# Patient Record
Sex: MALE | Race: BLACK OR AFRICAN AMERICAN | URBAN - METROPOLITAN AREA
[De-identification: names, ages, dates, MRNs, and addresses within clinical notes are randomized per-mention and may not be internally consistent; named-entity substitution may affect disease eponyms.]

---

## 2022-02-15 ENCOUNTER — OFFICE VISIT (OUTPATIENT)
Dept: URBAN - METROPOLITAN AREA CLINIC 74 | Facility: CLINIC | Age: 72
End: 2022-02-15
Payer: MEDICARE

## 2022-02-15 ENCOUNTER — WEB ENCOUNTER (OUTPATIENT)
Dept: URBAN - METROPOLITAN AREA CLINIC 74 | Facility: CLINIC | Age: 72
End: 2022-02-15

## 2022-02-15 DIAGNOSIS — K21.9 GASTROESOPHAGEAL REFLUX DISEASE WITHOUT ESOPHAGITIS: ICD-10-CM

## 2022-02-15 DIAGNOSIS — Z12.11 COLON CANCER SCREENING: ICD-10-CM

## 2022-02-15 DIAGNOSIS — Z80.0 FAMILY HISTORY OF MALIGNANT NEOPLASM OF COLON IN RELATIVE DIAGNOSED WHEN OLDER THAN 50 YEARS OF AGE: ICD-10-CM

## 2022-02-15 PROCEDURE — 99203 OFFICE O/P NEW LOW 30 MIN: CPT | Performed by: STUDENT IN AN ORGANIZED HEALTH CARE EDUCATION/TRAINING PROGRAM

## 2022-02-15 RX ORDER — BENAZEPRIL HYDROCHLORIDE 40 MG/1
1 TABLET TABLET, FILM COATED ORAL ONCE A DAY
Status: ACTIVE | COMMUNITY

## 2022-02-15 RX ORDER — APIXABAN 5 MG/1
AS DIRECTED TABLET, FILM COATED ORAL
Status: ACTIVE | COMMUNITY

## 2022-02-15 RX ORDER — DICLOFENAC POTASSIUM 50 MG/1
1 TABLET WITH FOOD OR MILK AS NEEDED TABLET, FILM COATED ORAL TWICE A DAY
Status: ACTIVE | COMMUNITY

## 2022-02-15 RX ORDER — AMLODIPINE BESYLATE 10 MG/1
1 TABLET TABLET ORAL ONCE A DAY
Status: ACTIVE | COMMUNITY

## 2022-02-15 RX ORDER — CLONIDINE 0.3 MG/D
1 PATCH TO SKIN PATCH TRANSDERMAL
Status: ACTIVE | COMMUNITY

## 2022-02-15 RX ORDER — ATORVASTATIN CALCIUM 40 MG/1
1 TABLET TABLET, FILM COATED ORAL ONCE A DAY
Status: ACTIVE | COMMUNITY

## 2022-02-15 RX ORDER — FUROSEMIDE 20 MG/1
1 TABLET TABLET ORAL ONCE A DAY
Status: ACTIVE | COMMUNITY

## 2022-02-15 RX ORDER — CARVEDILOL 25 MG/1
1 TABLET WITH FOOD TABLET, FILM COATED ORAL TWICE A DAY
Status: ACTIVE | COMMUNITY

## 2022-02-15 RX ORDER — OMEPRAZOLE 40 MG/1
1 CAPSULE 30 MINUTES BEFORE MORNING MEAL CAPSULE, DELAYED RELEASE ORAL ONCE A DAY
Status: ACTIVE | COMMUNITY

## 2022-02-15 RX ORDER — METFORMIN HYDROCHLORIDE 500 MG/1
1 TABLET WITH A MEAL TABLET, FILM COATED ORAL ONCE A DAY
Status: ACTIVE | COMMUNITY

## 2022-02-15 RX ORDER — ALLOPURINOL 100 MG/1
1 TABLET TABLET ORAL ONCE A DAY
Status: ACTIVE | COMMUNITY

## 2022-02-15 RX ORDER — POLYETHYLENE GLYCOL 3350, SODIUM SULFATE, SODIUM CHLORIDE, POTASSIUM CHLORIDE, ASCORBIC ACID, SODIUM ASCORBATE 140-9-5.2G
AS DIRECTED KIT ORAL ONCE
Qty: 1 KIT | Refills: 0 | OUTPATIENT
Start: 2022-02-15 | End: 2022-02-16

## 2022-02-15 RX ORDER — ONDANSETRON 4 MG/1
1 TABLET ON THE TONGUE AND ALLOW TO DISSOLVE TABLET, ORALLY DISINTEGRATING ORAL ONCE A DAY
Status: ACTIVE | COMMUNITY

## 2022-02-15 NOTE — HPI-TODAY'S VISIT:
72 yo M  new to me  Mild compensated CHF (last EF- 41-45%) currently euvolemic A. fib and on eliquis Diabetic - no insulin dependent  Family h/o colon cancer in father and brother both at age 60s.  Last colonoscopy about 10 years or more back. No polyps on last colonoscopy.  No abdomen pain, nausea, vomiting, constipation, diarrhea, blood in stool, reflux (well controlled on daily PPI) or dysphagia (h/o dysphagia and EGD dilation about 10 years back but has done well since then on PPI). Appetite at baseline. Denies any unintentional weight loss.

## 2022-02-24 ENCOUNTER — TELEPHONE ENCOUNTER (OUTPATIENT)
Dept: URBAN - METROPOLITAN AREA CLINIC 74 | Facility: CLINIC | Age: 72
End: 2022-02-24

## 2022-05-05 ENCOUNTER — OFFICE VISIT (OUTPATIENT)
Dept: URBAN - METROPOLITAN AREA SURGERY CENTER 30 | Facility: SURGERY CENTER | Age: 72
End: 2022-05-05

## 2022-06-01 ENCOUNTER — TELEPHONE ENCOUNTER (OUTPATIENT)
Dept: URBAN - METROPOLITAN AREA CLINIC 6 | Facility: CLINIC | Age: 72
End: 2022-06-01

## 2022-06-09 ENCOUNTER — OFFICE VISIT (OUTPATIENT)
Dept: URBAN - METROPOLITAN AREA SURGERY CENTER 30 | Facility: SURGERY CENTER | Age: 72
End: 2022-06-09

## 2022-08-29 ENCOUNTER — OFFICE VISIT (OUTPATIENT)
Dept: URBAN - METROPOLITAN AREA CLINIC 74 | Facility: CLINIC | Age: 72
End: 2022-08-29

## 2022-09-12 ENCOUNTER — LAB OUTSIDE AN ENCOUNTER (OUTPATIENT)
Dept: URBAN - METROPOLITAN AREA CLINIC 74 | Facility: CLINIC | Age: 72
End: 2022-09-12

## 2022-09-12 ENCOUNTER — OFFICE VISIT (OUTPATIENT)
Dept: URBAN - METROPOLITAN AREA CLINIC 74 | Facility: CLINIC | Age: 72
End: 2022-09-12
Payer: MEDICARE

## 2022-09-12 DIAGNOSIS — Z12.11 COLON CANCER SCREENING: ICD-10-CM

## 2022-09-12 DIAGNOSIS — R11.0 NAUSEA: ICD-10-CM

## 2022-09-12 DIAGNOSIS — K21.9 GASTROESOPHAGEAL REFLUX DISEASE WITHOUT ESOPHAGITIS: ICD-10-CM

## 2022-09-12 DIAGNOSIS — Z80.0 FAMILY HISTORY OF MALIGNANT NEOPLASM OF COLON IN RELATIVE DIAGNOSED WHEN OLDER THAN 50 YEARS OF AGE: ICD-10-CM

## 2022-09-12 PROCEDURE — 99214 OFFICE O/P EST MOD 30 MIN: CPT | Performed by: INTERNAL MEDICINE

## 2022-09-12 RX ORDER — SILDENAFIL CITRATE 100 MG/1
1 TABLET AS NEEDED TABLET, FILM COATED ORAL ONCE A DAY
Status: ACTIVE | COMMUNITY

## 2022-09-12 RX ORDER — CLONIDINE 0.3 MG/D
1 PATCH TO SKIN PATCH TRANSDERMAL
Status: ACTIVE | COMMUNITY

## 2022-09-12 RX ORDER — ONDANSETRON 4 MG/1
1 TABLET ON THE TONGUE AND ALLOW TO DISSOLVE TABLET, ORALLY DISINTEGRATING ORAL ONCE A DAY
Status: ACTIVE | COMMUNITY

## 2022-09-12 RX ORDER — ALLOPURINOL 100 MG/1
1 TABLET TABLET ORAL ONCE A DAY
Status: ACTIVE | COMMUNITY

## 2022-09-12 RX ORDER — POTASSIUM CHLORIDE 1500 MG/1
1 TABLET WITH FOOD TABLET, EXTENDED RELEASE ORAL ONCE A DAY
Status: ACTIVE | COMMUNITY

## 2022-09-12 RX ORDER — AMLODIPINE BESYLATE 10 MG/1
1 TABLET TABLET ORAL ONCE A DAY
Status: ACTIVE | COMMUNITY

## 2022-09-12 RX ORDER — TRAZODONE HYDROCHLORIDE 100 MG/1
1 TABLET AT BEDTIME TABLET ORAL ONCE A DAY
Status: ACTIVE | COMMUNITY

## 2022-09-12 RX ORDER — OMEPRAZOLE 40 MG/1
1 CAPSULE 30 MINUTES BEFORE MORNING MEAL CAPSULE, DELAYED RELEASE ORAL ONCE A DAY
Status: ACTIVE | COMMUNITY

## 2022-09-12 RX ORDER — CARVEDILOL 25 MG/1
1 TABLET WITH FOOD TABLET, FILM COATED ORAL TWICE A DAY
Status: ACTIVE | COMMUNITY

## 2022-09-12 RX ORDER — DICLOFENAC POTASSIUM 50 MG/1
1 TABLET WITH FOOD OR MILK AS NEEDED TABLET, FILM COATED ORAL TWICE A DAY
Status: ACTIVE | COMMUNITY

## 2022-09-12 RX ORDER — METFORMIN HYDROCHLORIDE 500 MG/1
1 TABLET WITH A MEAL TABLET, FILM COATED ORAL ONCE A DAY
Status: ACTIVE | COMMUNITY

## 2022-09-12 RX ORDER — BENAZEPRIL HYDROCHLORIDE 40 MG/1
1 TABLET TABLET, FILM COATED ORAL ONCE A DAY
Status: ACTIVE | COMMUNITY

## 2022-09-12 RX ORDER — APIXABAN 5 MG/1
AS DIRECTED TABLET, FILM COATED ORAL
Status: ACTIVE | COMMUNITY

## 2022-09-12 RX ORDER — ATORVASTATIN CALCIUM 40 MG/1
1 TABLET TABLET, FILM COATED ORAL ONCE A DAY
Status: ACTIVE | COMMUNITY

## 2022-09-12 RX ORDER — FUROSEMIDE 20 MG/1
1 TABLET TABLET ORAL ONCE A DAY
Status: ACTIVE | COMMUNITY

## 2022-09-12 RX ORDER — POLYETHYLENE GLYCOL 3350, SODIUM SULFATE ANHYDROUS, SODIUM BICARBONATE, SODIUM CHLORIDE, POTASSIUM CHLORIDE 236; 22.74; 6.74; 5.86; 2.97 G/4L; G/4L; G/4L; G/4L; G/4L
AS DIRECTED POWDER, FOR SOLUTION ORAL
Qty: 1 BOTTLE | Refills: 0 | OUTPATIENT
Start: 2022-09-12 | End: 2022-09-13

## 2022-09-12 NOTE — HPI-TODAY'S VISIT:
73 yo AAM here for constipation. prune juice helps. he gets short of breath and bloated when constipation worsens. on diuretics that make him constipated. Has aortic aneurysm. it was 5 cm last year. just had CTA done. result is not back yet.  Mild compensated CHF (last EF- 41-45%) currently euvolemic on diuretics A. fib and on eliquis Diabetic - no insulin dependent  Family h/o colon cancer in father and brother both at age 60s. sister had gastric cancer  Last colonoscopy about 10 years or more back. No polyps on last colonoscopy.  Has been experiencing nausea/bloating. occ dizziness. better after BM. Appetite at baseline. weight is down after starting diuretics. c/o orthopnea

## 2022-09-19 ENCOUNTER — TELEPHONE ENCOUNTER (OUTPATIENT)
Dept: URBAN - METROPOLITAN AREA CLINIC 74 | Facility: CLINIC | Age: 72
End: 2022-09-19

## 2022-09-22 ENCOUNTER — OFFICE VISIT (OUTPATIENT)
Dept: URBAN - METROPOLITAN AREA CLINIC 74 | Facility: CLINIC | Age: 72
End: 2022-09-22

## 2022-10-10 ENCOUNTER — TELEPHONE ENCOUNTER (OUTPATIENT)
Dept: URBAN - METROPOLITAN AREA CLINIC 74 | Facility: CLINIC | Age: 72
End: 2022-10-10

## 2022-11-15 ENCOUNTER — OFFICE VISIT (OUTPATIENT)
Dept: URBAN - METROPOLITAN AREA MEDICAL CENTER 9 | Facility: MEDICAL CENTER | Age: 72
End: 2022-11-15
Payer: MEDICARE

## 2022-11-15 DIAGNOSIS — Z12.11 COLON CANCER SCREENING: ICD-10-CM

## 2022-11-15 DIAGNOSIS — K29.60 ADENOPAPILLOMATOSIS GASTRICA: ICD-10-CM

## 2022-11-15 DIAGNOSIS — R10.13 ABDOMINAL DISCOMFORT, EPIGASTRIC: ICD-10-CM

## 2022-11-15 DIAGNOSIS — D12.3 ADENOMA OF TRANSVERSE COLON: ICD-10-CM

## 2022-11-15 DIAGNOSIS — K21.9 ACID REFLUX: ICD-10-CM

## 2022-11-15 PROCEDURE — 43239 EGD BIOPSY SINGLE/MULTIPLE: CPT | Performed by: INTERNAL MEDICINE

## 2022-11-15 PROCEDURE — 45385 COLONOSCOPY W/LESION REMOVAL: CPT | Performed by: INTERNAL MEDICINE

## 2022-12-12 ENCOUNTER — OFFICE VISIT (OUTPATIENT)
Dept: URBAN - METROPOLITAN AREA CLINIC 74 | Facility: CLINIC | Age: 72
End: 2022-12-12
Payer: MEDICARE

## 2022-12-12 VITALS
DIASTOLIC BLOOD PRESSURE: 98 MMHG | HEIGHT: 68 IN | WEIGHT: 219 LBS | BODY MASS INDEX: 33.19 KG/M2 | TEMPERATURE: 96.8 F | SYSTOLIC BLOOD PRESSURE: 124 MMHG | HEART RATE: 78 BPM

## 2022-12-12 DIAGNOSIS — Z12.11 COLON CANCER SCREENING: ICD-10-CM

## 2022-12-12 DIAGNOSIS — Z80.0 FAMILY HISTORY OF MALIGNANT NEOPLASM OF COLON IN RELATIVE DIAGNOSED WHEN OLDER THAN 50 YEARS OF AGE: ICD-10-CM

## 2022-12-12 DIAGNOSIS — R11.0 NAUSEA: ICD-10-CM

## 2022-12-12 DIAGNOSIS — K21.9 GASTROESOPHAGEAL REFLUX DISEASE WITHOUT ESOPHAGITIS: ICD-10-CM

## 2022-12-12 PROBLEM — 154051000119101: Status: ACTIVE | Noted: 2022-02-15

## 2022-12-12 PROBLEM — 266435005: Status: ACTIVE | Noted: 2022-02-15

## 2022-12-12 PROBLEM — 422587007: Status: ACTIVE | Noted: 2022-09-12

## 2022-12-12 PROCEDURE — 99213 OFFICE O/P EST LOW 20 MIN: CPT | Performed by: INTERNAL MEDICINE

## 2022-12-12 RX ORDER — OMEPRAZOLE 40 MG/1
1 CAPSULE 30 MINUTES BEFORE MORNING MEAL CAPSULE, DELAYED RELEASE ORAL ONCE A DAY
Status: ACTIVE | COMMUNITY

## 2022-12-12 RX ORDER — APIXABAN 5 MG/1
AS DIRECTED TABLET, FILM COATED ORAL
Status: ACTIVE | COMMUNITY

## 2022-12-12 RX ORDER — ONDANSETRON 4 MG/1
1 TABLET ON THE TONGUE AND ALLOW TO DISSOLVE TABLET, ORALLY DISINTEGRATING ORAL ONCE A DAY
Status: ACTIVE | COMMUNITY

## 2022-12-12 RX ORDER — ATORVASTATIN CALCIUM 40 MG/1
1 TABLET TABLET, FILM COATED ORAL ONCE A DAY
Status: ACTIVE | COMMUNITY

## 2022-12-12 RX ORDER — ALLOPURINOL 100 MG/1
1 TABLET TABLET ORAL ONCE A DAY
Status: ACTIVE | COMMUNITY

## 2022-12-12 RX ORDER — BENAZEPRIL HYDROCHLORIDE 40 MG/1
1 TABLET TABLET, FILM COATED ORAL ONCE A DAY
Status: ACTIVE | COMMUNITY

## 2022-12-12 RX ORDER — CARVEDILOL 25 MG/1
1 TABLET WITH FOOD TABLET, FILM COATED ORAL TWICE A DAY
Status: ACTIVE | COMMUNITY

## 2022-12-12 RX ORDER — METFORMIN HYDROCHLORIDE 500 MG/1
1 TABLET WITH A MEAL TABLET, FILM COATED ORAL ONCE A DAY
Status: ACTIVE | COMMUNITY

## 2022-12-12 RX ORDER — FUROSEMIDE 20 MG/1
1 TABLET TABLET ORAL ONCE A DAY
Status: ACTIVE | COMMUNITY

## 2022-12-12 RX ORDER — DICLOFENAC POTASSIUM 50 MG/1
1 TABLET WITH FOOD OR MILK AS NEEDED TABLET, FILM COATED ORAL TWICE A DAY
Status: ACTIVE | COMMUNITY

## 2022-12-12 RX ORDER — TRAZODONE HYDROCHLORIDE 100 MG/1
1 TABLET AT BEDTIME TABLET ORAL ONCE A DAY
Status: ACTIVE | COMMUNITY

## 2022-12-12 RX ORDER — CLONIDINE 0.3 MG/D
1 PATCH TO SKIN PATCH TRANSDERMAL
Status: ACTIVE | COMMUNITY

## 2022-12-12 RX ORDER — POTASSIUM CHLORIDE 1500 MG/1
1 TABLET WITH FOOD TABLET, EXTENDED RELEASE ORAL ONCE A DAY
Status: ACTIVE | COMMUNITY

## 2022-12-12 RX ORDER — SILDENAFIL CITRATE 100 MG/1
1 TABLET AS NEEDED TABLET, FILM COATED ORAL ONCE A DAY
Status: ACTIVE | COMMUNITY

## 2022-12-12 RX ORDER — AMLODIPINE BESYLATE 10 MG/1
1 TABLET TABLET ORAL ONCE A DAY
Status: ACTIVE | COMMUNITY

## 2022-12-12 NOTE — HPI-TODAY'S VISIT:
71 yo AAM here for constipation. prune juice helps. he gets short of breath and bloated when constipation worsens. on diuretics that make him constipated. Has aortic aneurysm. it was 5 cm last year. follows up with vascular surgeon.  Mild compensated CHF (last EF- 41-45%) currently euvolemic on diuretics A. fib and on eliquis Diabetic - no insulin dependent  Family h/o colon cancer in father and brother both at age 60s. sister had gastric cancer  Recent colonoscopy-one small tubular adenoma  Has been experiencing nausea/bloating. Has gastritis. No H.pylori. occ dizziness. better after BM. Appetite at baseline. weight is down after starting diuretics.

## 2023-06-15 ENCOUNTER — OFFICE VISIT (OUTPATIENT)
Dept: URBAN - METROPOLITAN AREA CLINIC 74 | Facility: CLINIC | Age: 73
End: 2023-06-15

## 2023-06-19 ENCOUNTER — OFFICE VISIT (OUTPATIENT)
Dept: URBAN - METROPOLITAN AREA CLINIC 74 | Facility: CLINIC | Age: 73
End: 2023-06-19

## 2023-07-21 NOTE — PHYSICAL EXAM CHEST:
Patient expressed difficulty affording Trelegy medications, [employer] provides assistance however applicants have to provide proof they've spent $600 OOP on medications. Patient expressed understanding and will get OOP statement from local pharmacy, then provide to LCSW. Will awat.   
breathing is unlabored without accessory muscle use, normal breath sounds
7

## 2024-02-08 ENCOUNTER — OV EP (OUTPATIENT)
Dept: URBAN - METROPOLITAN AREA CLINIC 74 | Facility: CLINIC | Age: 74
End: 2024-02-08
Payer: MEDICARE

## 2024-02-08 VITALS
DIASTOLIC BLOOD PRESSURE: 86 MMHG | WEIGHT: 214.4 LBS | HEIGHT: 68 IN | SYSTOLIC BLOOD PRESSURE: 122 MMHG | BODY MASS INDEX: 32.49 KG/M2 | TEMPERATURE: 96.8 F | HEART RATE: 91 BPM | OXYGEN SATURATION: 96 %

## 2024-02-08 DIAGNOSIS — K21.9 GASTROESOPHAGEAL REFLUX DISEASE WITHOUT ESOPHAGITIS: ICD-10-CM

## 2024-02-08 DIAGNOSIS — R11.0 NAUSEA: ICD-10-CM

## 2024-02-08 DIAGNOSIS — R42 DIZZINESS: ICD-10-CM

## 2024-02-08 DIAGNOSIS — R55 NEAR SYNCOPE: ICD-10-CM

## 2024-02-08 PROBLEM — 49436004: Status: ACTIVE | Noted: 2024-02-08

## 2024-02-08 PROBLEM — 88805009: Status: ACTIVE | Noted: 2024-02-08

## 2024-02-08 PROCEDURE — 99214 OFFICE O/P EST MOD 30 MIN: CPT | Performed by: NURSE PRACTITIONER

## 2024-02-08 RX ORDER — CLONIDINE 0.3 MG/D
1 PATCH TO SKIN PATCH TRANSDERMAL
Status: ACTIVE | COMMUNITY

## 2024-02-08 RX ORDER — TRAZODONE HYDROCHLORIDE 100 MG/1
1 TABLET AT BEDTIME TABLET ORAL ONCE A DAY
Status: ACTIVE | COMMUNITY

## 2024-02-08 RX ORDER — OMEPRAZOLE 40 MG/1
1 CAPSULE 30 MINUTES BEFORE MORNING MEAL CAPSULE, DELAYED RELEASE ORAL ONCE A DAY
Status: ACTIVE | COMMUNITY

## 2024-02-08 RX ORDER — HYDROCHLOROTHIAZIDE 25 MG/1
TABLET ORAL
Qty: 90 TABLET | Status: ACTIVE | COMMUNITY

## 2024-02-08 RX ORDER — DICLOFENAC POTASSIUM 50 MG/1
1 TABLET WITH FOOD OR MILK AS NEEDED TABLET, FILM COATED ORAL TWICE A DAY
Status: ACTIVE | COMMUNITY

## 2024-02-08 RX ORDER — EMPAGLIFLOZIN 10 MG/1
TABLET, FILM COATED ORAL
Qty: 90 TABLET | Status: ACTIVE | COMMUNITY

## 2024-02-08 RX ORDER — ATORVASTATIN CALCIUM 40 MG/1
1 TABLET TABLET, FILM COATED ORAL ONCE A DAY
Status: ACTIVE | COMMUNITY

## 2024-02-08 RX ORDER — ONDANSETRON HYDROCHLORIDE 4 MG/1
1 TABLET TABLET, FILM COATED ORAL ONCE A DAY
Qty: 30 | Refills: 0 | OUTPATIENT
Start: 2024-02-08

## 2024-02-08 RX ORDER — AMLODIPINE BESYLATE 10 MG/1
1 TABLET TABLET ORAL ONCE A DAY
Status: ACTIVE | COMMUNITY

## 2024-02-08 RX ORDER — CARVEDILOL 25 MG/1
1 TABLET WITH FOOD TABLET, FILM COATED ORAL TWICE A DAY
Status: ACTIVE | COMMUNITY

## 2024-02-08 RX ORDER — FUROSEMIDE 20 MG/1
1 TABLET TABLET ORAL ONCE A DAY
Status: ACTIVE | COMMUNITY

## 2024-02-08 RX ORDER — APIXABAN 5 MG/1
AS DIRECTED TABLET, FILM COATED ORAL
Status: ACTIVE | COMMUNITY

## 2024-02-08 RX ORDER — METFORMIN HYDROCHLORIDE 500 MG/1
1 TABLET WITH A MEAL TABLET, FILM COATED ORAL ONCE A DAY
Status: ACTIVE | COMMUNITY

## 2024-02-08 RX ORDER — BENAZEPRIL HYDROCHLORIDE 40 MG/1
1 TABLET TABLET, FILM COATED ORAL ONCE A DAY
Status: ACTIVE | COMMUNITY

## 2024-02-08 RX ORDER — ALLOPURINOL 100 MG/1
1 TABLET TABLET ORAL ONCE A DAY
Status: ACTIVE | COMMUNITY

## 2024-02-08 RX ORDER — ONDANSETRON 4 MG/1
1 TABLET ON THE TONGUE AND ALLOW TO DISSOLVE TABLET, ORALLY DISINTEGRATING ORAL ONCE A DAY
Status: ACTIVE | COMMUNITY

## 2024-02-08 RX ORDER — SILDENAFIL CITRATE 100 MG/1
1 TABLET AS NEEDED TABLET, FILM COATED ORAL ONCE A DAY
Status: ACTIVE | COMMUNITY

## 2024-02-08 RX ORDER — POTASSIUM CHLORIDE 1500 MG/1
1 TABLET WITH FOOD TABLET, EXTENDED RELEASE ORAL ONCE A DAY
Status: ACTIVE | COMMUNITY

## 2024-02-08 NOTE — PHYSICAL EXAM GASTROINTESTINAL
Abdomen large , soft, nontender, nondistended , no guarding or rigidity , no masses palpable , normal bowel sounds , Liver and Spleen,  no hepatosplenomegaly , liver nontender

## 2024-02-08 NOTE — HPI-TODAY'S VISIT:
73-year-old male patient with past medical history as listed below, known to Dr. Molina.   Last seen December 12, 2022 for EGD follow-up.  Constipation alleviated by prune juice.  Complaints of shortness of breath and bloated when constipation worsens.  Has aortic aneurysm follows with vascular surgeon.  CHF on diuretics.  A-fib on Eliquis.  Diabetic not insulin-dependent.  Family history of colon cancer in father and brother both age 60.  Sister with gastric cancer.  --Colonoscopy and EGD November 15, 2022 transverse colon polyp, diverticulosis sigmoid and descending colon.  Grade 1 internal hemorrhoids.  Mild inflammation on EGD chronic gastritis. Polyp was tubular adenoma stomach biopsies show chronic inflammation.  Advised to use prune juice and MiraLAX.  -- The patient presents accompanied by family for an unscheduled follow up. He has c/o dizziness, nausea, near syncope, staggering into walls. Had episode in store of near syncope. He is stable and in no distress in office today.  He saw PCP 2/5/24, had labs and H. Pylori stool ordered. CBC and CMP normal aside from decreased kidney function. Stool pending. He denies burping, denies any epigastric, abdominal pain, no foul smelling stools. He says his stool he collected this morning for stool sample was normal. He does have chronic constipation that he has to take MiraLAX, prune juice, enemas for. He is taking gas X for bloating. He has nausea but no vomiting. He says his blood sugars are fluctuating. He will get up eat, then a few hours later feel nauseated and dizzy, will have juice or take a sugar pill and feel better. Discussed with him his prior EGD and colonoscopy findings. Not suspicious of H. Pylori based on symptoms and no prior findings on biopsy. He is with chronic constipation, possibly overflow diarrhea at times, and over correction of constipation. More suspicious of either cardiac or endocrine cause of symptoms. His stools are better. He has recently had another diuretic added to his meds and his metformin dosage is unclear, he is uncertain how many and how many times a day he is to take the 500 mg.   Discussed LOWFODMAP and to continue Gas-X, and measures for constipation. Will refill Zofran. Advised to see cardiology at next available appointment and to reach out to PCP for clarification on metformin dosage, log glucose checks and symptoms.

## 2024-11-21 ENCOUNTER — APPOINTMENT (RX ONLY)
Dept: URBAN - METROPOLITAN AREA CLINIC 162 | Facility: CLINIC | Age: 74
Setting detail: DERMATOLOGY
End: 2024-11-21

## 2024-11-21 DIAGNOSIS — D485 NEOPLASM OF UNCERTAIN BEHAVIOR OF SKIN: ICD-10-CM

## 2024-11-21 DIAGNOSIS — L82.1 OTHER SEBORRHEIC KERATOSIS: ICD-10-CM

## 2024-11-21 PROBLEM — D48.5 NEOPLASM OF UNCERTAIN BEHAVIOR OF SKIN: Status: ACTIVE | Noted: 2024-11-21

## 2024-11-21 PROCEDURE — ? COUNSELING

## 2024-11-21 PROCEDURE — 11102 TANGNTL BX SKIN SINGLE LES: CPT

## 2024-11-21 PROCEDURE — 99202 OFFICE O/P NEW SF 15 MIN: CPT | Mod: 25

## 2024-11-21 PROCEDURE — ? BIOPSY BY SHAVE METHOD

## 2024-11-21 ASSESSMENT — LOCATION DETAILED DESCRIPTION DERM
LOCATION DETAILED: RIGHT MEDIAL UPPER BACK
LOCATION DETAILED: LEFT MEDIAL UPPER BACK

## 2024-11-21 ASSESSMENT — LOCATION ZONE DERM: LOCATION ZONE: TRUNK

## 2024-11-21 ASSESSMENT — LOCATION SIMPLE DESCRIPTION DERM
LOCATION SIMPLE: LEFT UPPER BACK
LOCATION SIMPLE: RIGHT UPPER BACK

## 2024-11-21 NOTE — HPI: EVALUATION OF SKIN LESION(S)
Hpi Title: Evaluation of Skin Lesions
How Severe Are Your Spot(S)?: mild
Have Your Spot(S) Been Treated In The Past?: has not been treated
What Type Of Note Output Would You Prefer (Optional)?: Bullet Format
Additional History: Patient is present today with itchy spots on the back. Patient states that there is one raised lesion on the back that is painful and occasionally bleeds